# Patient Record
Sex: MALE | Race: WHITE | Employment: FULL TIME | URBAN - METROPOLITAN AREA
[De-identification: names, ages, dates, MRNs, and addresses within clinical notes are randomized per-mention and may not be internally consistent; named-entity substitution may affect disease eponyms.]

---

## 2017-08-06 ENCOUNTER — APPOINTMENT (OUTPATIENT)
Dept: GENERAL RADIOLOGY | Facility: CLINIC | Age: 32
End: 2017-08-06
Payer: COMMERCIAL

## 2017-08-06 ENCOUNTER — HOSPITAL ENCOUNTER (EMERGENCY)
Facility: CLINIC | Age: 32
Discharge: HOME | End: 2017-08-07
Attending: EMERGENCY MEDICINE
Payer: COMMERCIAL

## 2017-08-06 VITALS
SYSTOLIC BLOOD PRESSURE: 132 MMHG | TEMPERATURE: 98.2 F | BODY MASS INDEX: 21.69 KG/M2 | RESPIRATION RATE: 16 BRPM | HEART RATE: 80 BPM | DIASTOLIC BLOOD PRESSURE: 82 MMHG | WEIGHT: 135 LBS | HEIGHT: 66 IN | OXYGEN SATURATION: 100 %

## 2017-08-06 DIAGNOSIS — S86.912A KNEE STRAIN, LEFT, INITIAL ENCOUNTER: Primary | ICD-10-CM

## 2017-08-06 PROCEDURE — 73562 X-RAY EXAM OF KNEE 3: CPT

## 2017-08-06 PROCEDURE — 99283 EMERGENCY DEPT VISIT LOW MDM: CPT

## 2017-08-06 ASSESSMENT — PAIN DESCRIPTION - LOCATION: LOCATION: KNEE

## 2017-08-06 ASSESSMENT — PAIN SCALES - GENERAL: PAINLEVEL_OUTOF10: 7

## 2017-08-06 ASSESSMENT — PAIN DESCRIPTION - ORIENTATION: ORIENTATION: LEFT

## 2017-08-06 ASSESSMENT — PAIN DESCRIPTION - PAIN TYPE: TYPE: ACUTE PAIN

## 2017-08-07 RX ORDER — IBUPROFEN 800 MG/1
800 TABLET ORAL EVERY 8 HOURS PRN
Qty: 30 TABLET | Refills: 0 | Status: SHIPPED | OUTPATIENT
Start: 2017-08-07

## 2023-02-17 ENCOUNTER — OFFICE VISIT (OUTPATIENT)
Dept: FAMILY MEDICINE CLINIC | Age: 38
End: 2023-02-17

## 2023-02-17 VITALS
DIASTOLIC BLOOD PRESSURE: 92 MMHG | HEIGHT: 66 IN | HEART RATE: 107 BPM | WEIGHT: 146 LBS | SYSTOLIC BLOOD PRESSURE: 150 MMHG | BODY MASS INDEX: 23.46 KG/M2

## 2023-02-17 DIAGNOSIS — Z00.01 ENCOUNTER FOR WELL ADULT EXAM WITH ABNORMAL FINDINGS: Primary | ICD-10-CM

## 2023-02-17 DIAGNOSIS — F90.2 ATTENTION DEFICIT HYPERACTIVITY DISORDER (ADHD), COMBINED TYPE: ICD-10-CM

## 2023-02-17 DIAGNOSIS — R03.0 ELEVATED BLOOD PRESSURE READING: ICD-10-CM

## 2023-02-17 DIAGNOSIS — F19.11 HISTORY OF SUBSTANCE ABUSE (HCC): ICD-10-CM

## 2023-02-17 DIAGNOSIS — F33.42 RECURRENT MAJOR DEPRESSIVE DISORDER, IN FULL REMISSION (HCC): ICD-10-CM

## 2023-02-17 DIAGNOSIS — Z13.6 SCREENING FOR CARDIOVASCULAR CONDITION: ICD-10-CM

## 2023-02-17 PROBLEM — I48.91 ATRIAL FIBRILLATION (HCC): Status: ACTIVE | Noted: 2023-02-17

## 2023-02-17 RX ORDER — DEXTROAMPHETAMINE SACCHARATE, AMPHETAMINE ASPARTATE MONOHYDRATE, DEXTROAMPHETAMINE SULFATE AND AMPHETAMINE SULFATE 6.25; 6.25; 6.25; 6.25 MG/1; MG/1; MG/1; MG/1
CAPSULE, EXTENDED RELEASE ORAL
COMMUNITY
Start: 2023-02-06

## 2023-02-17 RX ORDER — BUPROPION HYDROCHLORIDE 300 MG/1
TABLET ORAL
COMMUNITY
Start: 2023-01-23

## 2023-02-17 SDOH — ECONOMIC STABILITY: HOUSING INSECURITY
IN THE LAST 12 MONTHS, WAS THERE A TIME WHEN YOU DID NOT HAVE A STEADY PLACE TO SLEEP OR SLEPT IN A SHELTER (INCLUDING NOW)?: NO

## 2023-02-17 SDOH — ECONOMIC STABILITY: FOOD INSECURITY: WITHIN THE PAST 12 MONTHS, THE FOOD YOU BOUGHT JUST DIDN'T LAST AND YOU DIDN'T HAVE MONEY TO GET MORE.: NEVER TRUE

## 2023-02-17 SDOH — ECONOMIC STABILITY: FOOD INSECURITY: WITHIN THE PAST 12 MONTHS, YOU WORRIED THAT YOUR FOOD WOULD RUN OUT BEFORE YOU GOT MONEY TO BUY MORE.: NEVER TRUE

## 2023-02-17 SDOH — ECONOMIC STABILITY: INCOME INSECURITY: HOW HARD IS IT FOR YOU TO PAY FOR THE VERY BASICS LIKE FOOD, HOUSING, MEDICAL CARE, AND HEATING?: SOMEWHAT HARD

## 2023-02-17 ASSESSMENT — ENCOUNTER SYMPTOMS
CHEST TIGHTNESS: 0
CONSTIPATION: 0
SHORTNESS OF BREATH: 0
BACK PAIN: 0
DIARRHEA: 1
ABDOMINAL PAIN: 0
SINUS PRESSURE: 0
COUGH: 0
COLOR CHANGE: 0

## 2023-02-17 ASSESSMENT — PATIENT HEALTH QUESTIONNAIRE - PHQ9
SUM OF ALL RESPONSES TO PHQ QUESTIONS 1-9: 1
SUM OF ALL RESPONSES TO PHQ QUESTIONS 1-9: 1
SUM OF ALL RESPONSES TO PHQ9 QUESTIONS 1 & 2: 1
2. FEELING DOWN, DEPRESSED OR HOPELESS: 1
SUM OF ALL RESPONSES TO PHQ QUESTIONS 1-9: 1
1. LITTLE INTEREST OR PLEASURE IN DOING THINGS: 0
SUM OF ALL RESPONSES TO PHQ QUESTIONS 1-9: 1

## 2023-02-17 NOTE — PROGRESS NOTES
Daphne Stock is a 30 y.o. male who presents in office today with Self and Spouse establish new care with office. Previous PCP was n/a    Chief Complaint   Patient presents with    New Patient     Establish care, last seen PCP ~10 yrs ago    Orders     Would like to have lab work ordered    Addiction Problem     H/o drug addiction, used/snorted cocaine, heroin and pills x9 years, quit 2015 now feels he may have some sinus issues        History of Present Illness:     HPI    Here to establish care. Overall feeling well. Denies any acute problems. Established with Dr. Margie Wisdom for psychiatry in \[facility]\"". Takes Wellbutrin for mood stabilization and Adderall for ADHD. Previous history of snorting cocaine and heroin. Last use 2015. He is concerned about chronic congestion. Blows his nose and nothing comes out. He denies any pain, nosebleeds, chronic infections. Just feels congested. Has used saline rinse but doesn't seem to make difference. Last Eye exam 2022, wears glasses. Last dental exam 1 yr ago, does go every year. Care gaps: COVID-19 vaccine: Moderna x3, booster 10/2022  Colon CA screening:   n/a  Vaccines: Flu shot 2022, UTD Tdap   Hepatitis C/HIV screens:    Depression Screenin - established with Dr Margie Wisdom, psychiatry. Goes every 3 months. Health Maintenance Due   Topic Date Due    Pneumococcal 0-64 years Vaccine (1 - PCV) Never done    Depression Monitoring  Never done    HIV screen  Never done    Hepatitis C screen  Never done    DTaP/Tdap/Td vaccine (1 - Tdap) Never done    Flu vaccine (1) 2022        Patient Care Team:  ASAD Orosco CNP as PCP - General (Nurse Practitioner)  ASAD Orosco CNP as PCP - Empaneled Provider    Reviewed     [x] Past Medical, Family, and Social History was reviewed per writer and does contribute to the patient presenting condition.     [x] Laboratory Results, Vital signs, Imaging, Active Problems, Immunizations, Current/Recently Discontinued Medications, Health Maintenance Activities Due, Referral Notes (if available) were reviewed per writer     [x] Reviewed Depression screening if taken or valid today or any other valid screening tool (others seen below) Interpretation of Total Score DepressionSeverity: 1-4 = Minimal depression, 5-9 = Mild depression, 10-14 = Moderate depression, 15-19 = Moderately severe depression, 20-27 =Severe depression    PHQ Scores 2/17/2023   PHQ2 Score 1   PHQ9 Score 1     Interpretation of Total Score Depression Severity: 1-4 = Minimal depression, 5-9 = Mild depression, 10-14 = Moderate depression, 15-19 = Moderately severe depression, 20-27 = Severe depression     Review of Systems (Subjective)     Review of Systems   Constitutional:  Negative for activity change, fatigue and unexpected weight change. HENT:  Positive for congestion. Negative for nosebleeds, postnasal drip and sinus pressure. Eyes:  Negative for visual disturbance. Respiratory:  Negative for cough, chest tightness and shortness of breath. Cardiovascular:  Negative for chest pain and palpitations. Gastrointestinal:  Positive for diarrhea. Negative for abdominal pain and constipation. Since stopping use of opioids. Endocrine: Negative for cold intolerance, heat intolerance and polyuria. Genitourinary:  Negative for difficulty urinating and dysuria. Musculoskeletal:  Negative for arthralgias, back pain and myalgias. Skin:  Negative for color change and rash. Allergic/Immunologic: Negative for environmental allergies and food allergies. Neurological:  Negative for dizziness, light-headedness and headaches. Psychiatric/Behavioral:  Negative for dysphoric mood. The patient is not nervous/anxious.       See HPI     Physical Assessment (Objective)     BP (!) 150/92 (Position: Sitting, Cuff Size: Medium Adult)   Pulse (!) 107   Ht 5' 6\" (1.676 m)   Wt 146 lb (66.2 kg)   BMI 23.57 kg/m² Physical Exam  Vitals and nursing note reviewed. Constitutional:       Appearance: Normal appearance. He is normal weight. HENT:      Head: Normocephalic and atraumatic. Right Ear: External ear normal.      Left Ear: External ear normal.      Nose: Nose normal.      Mouth/Throat:      Mouth: Mucous membranes are moist.      Pharynx: Oropharynx is clear. Eyes:      Extraocular Movements: Extraocular movements intact. Conjunctiva/sclera: Conjunctivae normal.      Pupils: Pupils are equal, round, and reactive to light. Cardiovascular:      Rate and Rhythm: Regular rhythm. Tachycardia present. Pulses: Normal pulses. Heart sounds: Normal heart sounds. No murmur heard. Comments: Mild tachycardia,   Pulmonary:      Effort: Pulmonary effort is normal. No respiratory distress. Breath sounds: Normal breath sounds. No wheezing. Abdominal:      General: Bowel sounds are normal. There is no distension. Palpations: Abdomen is soft. Tenderness: There is no abdominal tenderness. Musculoskeletal:         General: Normal range of motion. Cervical back: Normal range of motion and neck supple. Skin:     General: Skin is warm and dry. Capillary Refill: Capillary refill takes less than 2 seconds. Neurological:      Mental Status: He is alert and oriented to person, place, and time. Gait: Gait normal.   Psychiatric:         Mood and Affect: Mood normal.         Behavior: Behavior normal.         Thought Content: Thought content normal.       Diagnoses / Plan:   1. Encounter for well adult exam with abnormal findings  -     CBC; Future  -     Comprehensive Metabolic Panel, Fasting; Future  -     Lipid Panel; Future  2. Screening for cardiovascular condition  -     CBC; Future  -     Comprehensive Metabolic Panel, Fasting; Future  -     Lipid Panel; Future  3.  History of substance abuse [facility])  Assessment & Plan:   Well-controlled, continue current medications and no use since April 2015  Orders:  -     CBC; Future  -     Comprehensive Metabolic Panel, Fasting; Future  -     Lipid Panel; Future  4. Recurrent major depressive disorder, in full remission ([facility] [address]  5. Attention deficit hyperactivity disorder (ADHD), combined type  Assessment & Plan:   Well-controlled, continue current medications; follows with Dr Jacob Bocanegra in [facility]  6. Elevated blood pressure reading     Heart rate and blood pressure elevated. As he is taking Adderall and nervous in provider office, he will recheck and report on home cuff and have BP checked in office when he comes in for blood work. Per smart watch, HR ranges from . Discussed may need to start antihypertensive or consider other ADHD options if Adderall causing elevations. Understanding and agreement was voiced with all above plans. All questions answered to satisfaction. Encouraged healthy diet and exercise. Call office with any questions or new or worsening symptoms or concerns. Return in about 1 year (around 2/17/2024), or if symptoms worsen or fail to improve, for Annual Visit/Physical.            Electronically signed by ASAD Correa CNP on 2/17/2023 at 3:43 PM    Note is dictated utilizing voice recognition software. Unfortunately this leads to occasional typographical errors. Please contact our office if you have any questions.

## 2023-02-27 ENCOUNTER — TELEPHONE (OUTPATIENT)
Dept: FAMILY MEDICINE CLINIC | Age: 38
End: 2023-02-27

## 2023-10-11 ENCOUNTER — HOSPITAL ENCOUNTER (EMERGENCY)
Age: 38
Discharge: HOME | End: 2023-10-12
Attending: EMERGENCY MEDICINE

## 2023-10-11 VITALS
HEIGHT: 66 IN | HEART RATE: 104 BPM | SYSTOLIC BLOOD PRESSURE: 147 MMHG | BODY MASS INDEX: 27.32 KG/M2 | RESPIRATION RATE: 20 BRPM | OXYGEN SATURATION: 97 % | DIASTOLIC BLOOD PRESSURE: 106 MMHG | WEIGHT: 170 LBS | TEMPERATURE: 98 F

## 2023-10-11 DIAGNOSIS — S21.111A LACERATION OF CHEST WALL, RIGHT, INITIAL ENCOUNTER: ICD-10-CM

## 2023-10-11 DIAGNOSIS — F10.929 ACUTE ALCOHOLIC INTOXICATION WITH COMPLICATION (HCC): Primary | ICD-10-CM

## 2023-10-11 DIAGNOSIS — F19.10 POLYSUBSTANCE ABUSE (HCC): ICD-10-CM

## 2023-10-11 DIAGNOSIS — S41.111A ARM LACERATION, RIGHT, INITIAL ENCOUNTER: ICD-10-CM

## 2023-10-11 LAB
ALBUMIN SERPL-MCNC: 4.6 G/DL (ref 3.5–5.2)
ALP SERPL-CCNC: 99 U/L (ref 40–129)
ALT SERPL-CCNC: 20 U/L (ref 5–41)
AMPHET UR QL SCN: POSITIVE
ANION GAP SERPL CALCULATED.3IONS-SCNC: 11 MMOL/L (ref 9–17)
APAP SERPL-MCNC: <5 UG/ML (ref 10–30)
AST SERPL-CCNC: 30 U/L
BARBITURATES UR QL SCN: NEGATIVE
BASOPHILS # BLD: 0 K/UL (ref 0–0.2)
BASOPHILS NFR BLD: 0 % (ref 0–2)
BENZODIAZ UR QL: NEGATIVE
BILIRUB SERPL-MCNC: 0.2 MG/DL (ref 0.3–1.2)
BUN SERPL-MCNC: 6 MG/DL (ref 6–20)
CALCIUM SERPL-MCNC: 9.2 MG/DL (ref 8.6–10.4)
CANNABINOIDS UR QL SCN: POSITIVE
CHLORIDE SERPL-SCNC: 103 MMOL/L (ref 98–107)
CO2 SERPL-SCNC: 26 MMOL/L (ref 20–31)
COCAINE UR QL SCN: POSITIVE
CREAT SERPL-MCNC: 1.1 MG/DL (ref 0.7–1.2)
EOSINOPHIL # BLD: 0.2 K/UL (ref 0–0.4)
EOSINOPHILS RELATIVE PERCENT: 1 % (ref 0–4)
ERYTHROCYTE [DISTWIDTH] IN BLOOD BY AUTOMATED COUNT: 13.1 % (ref 11.5–14.9)
ETHANOL PERCENT: 0.21 %
ETHANOLAMINE SERPL-MCNC: 207 MG/DL
FENTANYL UR QL: NEGATIVE
GFR SERPL CREATININE-BSD FRML MDRD: >60 ML/MIN/1.73M2
GLUCOSE SERPL-MCNC: 107 MG/DL (ref 70–99)
HCT VFR BLD AUTO: 43 % (ref 41–53)
HGB BLD-MCNC: 14.4 G/DL (ref 13.5–17.5)
LYMPHOCYTES NFR BLD: 2.2 K/UL (ref 1–4.8)
LYMPHOCYTES RELATIVE PERCENT: 19 % (ref 24–44)
MCH RBC QN AUTO: 31.2 PG (ref 26–34)
MCHC RBC AUTO-ENTMCNC: 33.4 G/DL (ref 31–37)
MCV RBC AUTO: 93.3 FL (ref 80–100)
METHADONE UR QL: NEGATIVE
MONOCYTES NFR BLD: 0.8 K/UL (ref 0.1–1.3)
MONOCYTES NFR BLD: 7 % (ref 1–7)
NEUTROPHILS NFR BLD: 73 % (ref 36–66)
NEUTS SEG NFR BLD: 8.4 K/UL (ref 1.3–9.1)
OPIATES UR QL SCN: NEGATIVE
OXYCODONE UR QL SCN: NEGATIVE
PCP UR QL SCN: NEGATIVE
PLATELET # BLD AUTO: 319 K/UL (ref 150–450)
PMV BLD AUTO: 7.8 FL (ref 6–12)
POTASSIUM SERPL-SCNC: 5.1 MMOL/L (ref 3.7–5.3)
PROT SERPL-MCNC: 7.8 G/DL (ref 6.4–8.3)
RBC # BLD AUTO: 4.61 M/UL (ref 4.5–5.9)
SALICYLATES SERPL-MCNC: <1 MG/DL (ref 3–10)
SODIUM SERPL-SCNC: 140 MMOL/L (ref 135–144)
TEST INFORMATION: ABNORMAL
WBC OTHER # BLD: 11.6 K/UL (ref 3.5–11)

## 2023-10-11 PROCEDURE — 80307 DRUG TEST PRSMV CHEM ANLYZR: CPT

## 2023-10-11 PROCEDURE — 90715 TDAP VACCINE 7 YRS/> IM: CPT | Performed by: EMERGENCY MEDICINE

## 2023-10-11 PROCEDURE — 80053 COMPREHEN METABOLIC PANEL: CPT

## 2023-10-11 PROCEDURE — 12002 RPR S/N/AX/GEN/TRNK2.6-7.5CM: CPT

## 2023-10-11 PROCEDURE — 80143 DRUG ASSAY ACETAMINOPHEN: CPT

## 2023-10-11 PROCEDURE — 36415 COLL VENOUS BLD VENIPUNCTURE: CPT

## 2023-10-11 PROCEDURE — 99284 EMERGENCY DEPT VISIT MOD MDM: CPT

## 2023-10-11 PROCEDURE — 90471 IMMUNIZATION ADMIN: CPT | Performed by: EMERGENCY MEDICINE

## 2023-10-11 PROCEDURE — G0480 DRUG TEST DEF 1-7 CLASSES: HCPCS

## 2023-10-11 PROCEDURE — 85025 COMPLETE CBC W/AUTO DIFF WBC: CPT

## 2023-10-11 PROCEDURE — 6360000002 HC RX W HCPCS: Performed by: EMERGENCY MEDICINE

## 2023-10-11 PROCEDURE — 80179 DRUG ASSAY SALICYLATE: CPT

## 2023-10-11 RX ORDER — HALOPERIDOL 5 MG/ML
5 INJECTION INTRAMUSCULAR
Status: DISCONTINUED | OUTPATIENT
Start: 2023-10-11 | End: 2023-10-12 | Stop reason: HOSPADM

## 2023-10-11 RX ORDER — DIPHENHYDRAMINE HYDROCHLORIDE 50 MG/ML
50 INJECTION INTRAMUSCULAR; INTRAVENOUS
Status: DISCONTINUED | OUTPATIENT
Start: 2023-10-11 | End: 2023-10-12 | Stop reason: HOSPADM

## 2023-10-11 RX ORDER — LORAZEPAM 2 MG/ML
2 INJECTION INTRAMUSCULAR
Status: DISCONTINUED | OUTPATIENT
Start: 2023-10-11 | End: 2023-10-12 | Stop reason: HOSPADM

## 2023-10-11 RX ORDER — HALOPERIDOL 5 MG/ML
5 INJECTION INTRAMUSCULAR
Status: DISCONTINUED | OUTPATIENT
Start: 2023-10-11 | End: 2023-10-11

## 2023-10-11 RX ORDER — LORAZEPAM 2 MG/ML
2 INJECTION INTRAMUSCULAR
Status: DISCONTINUED | OUTPATIENT
Start: 2023-10-11 | End: 2023-10-11

## 2023-10-11 RX ORDER — DIPHENHYDRAMINE HYDROCHLORIDE 50 MG/ML
50 INJECTION INTRAMUSCULAR; INTRAVENOUS
Status: DISCONTINUED | OUTPATIENT
Start: 2023-10-11 | End: 2023-10-11

## 2023-10-11 RX ADMIN — TETANUS TOXOID, REDUCED DIPHTHERIA TOXOID AND ACELLULAR PERTUSSIS VACCINE, ADSORBED 0.5 ML: 5; 2.5; 8; 8; 2.5 SUSPENSION INTRAMUSCULAR at 21:53

## 2023-10-11 ASSESSMENT — LIFESTYLE VARIABLES
HOW OFTEN DO YOU HAVE A DRINK CONTAINING ALCOHOL: MONTHLY OR LESS
HOW MANY STANDARD DRINKS CONTAINING ALCOHOL DO YOU HAVE ON A TYPICAL DAY: 7 TO 9

## 2023-10-12 PROCEDURE — 12002 RPR S/N/AX/GEN/TRNK2.6-7.5CM: CPT

## 2023-10-12 NOTE — ED NOTES
Pt alert and oriented at this time. Denies SI/HI. Pt discharged in stable condition with dc instructions. Pt ambulates to door with steady gait and without assistance.         Lenka Wallace RN  10/12/23 9913

## 2023-10-12 NOTE — ED NOTES
Pt heard yelling in the nurses station demanding to leave. Despite being redirected by RN and safe guard, pt continues to escalate.  Public Safety called       Saray Antunez RN  10/11/23 [id number]

## 2023-10-12 NOTE — ED NOTES
Patient changed to  [facility] and  pants with security  , All items  removed and locked  in [facility]     Epifanio Rondon RN  10/11/23 7864

## 2023-10-12 NOTE — ED TRIAGE NOTES
Mode of arrival (squad #, walk in, police, etc) : Squad        Chief complaint(s): Suicidal         Arrival Note (brief scenario, treatment PTA, etc). : Patient  Gianfranco Aime by police  with  Laceration to his right arm and right lateral Abdominal wall by Smashing with glass door . Patient is intoxicated  with ETOH. The police informed that  the patient  girl reported he was suicidal  and was making statement to kill himself. Patient admit that he some issues at home but denies suicidal  attempt and suicidal Does have an history of  suicidal ideation. Patient has been pink slip for suicidal attempt by police     C= Josey Caul you ever felt that you should Cut down on your drinking? \"  No  A= \"Have people Annoyed you by criticizing your drinking? \"  No  G= \"Have you ever felt bad or Guilty about your drinking? \"  No  E= \"Have you ever had a drink as an Eye-opener first thing in the morning to steady your nerves or to help a hangover? \"  No      Deferred []      Reason for deferring: N/A    *If yes to two or more: probable alcohol abuse. *

## 2023-10-12 NOTE — ED NOTES
Pts right arm laceration and right rib cage lacerations cleansed, irrigated, and closed with steri strips and dermabond. Pt tolerated well.       Eduarda Dumont RN  10/12/23 3807

## 2024-03-14 ENCOUNTER — HOSPITAL ENCOUNTER (OUTPATIENT)
Age: 39
Discharge: HOME | End: 2024-03-14

## 2024-03-14 LAB — LITHIUM LEVEL: 0.4 MMOL/L (ref 0.6–1.2)

## 2024-03-14 PROCEDURE — 80178 ASSAY OF LITHIUM: CPT

## 2024-03-14 PROCEDURE — 36415 COLL VENOUS BLD VENIPUNCTURE: CPT

## 2024-05-28 ENCOUNTER — OFFICE VISIT (OUTPATIENT)
Dept: FAMILY MEDICINE CLINIC | Age: 39
End: 2024-05-28
Payer: COMMERCIAL

## 2024-05-28 VITALS — DIASTOLIC BLOOD PRESSURE: 86 MMHG | HEART RATE: 97 BPM | SYSTOLIC BLOOD PRESSURE: 130 MMHG

## 2024-05-28 DIAGNOSIS — R68.89 HEAT INTOLERANCE: Primary | ICD-10-CM

## 2024-05-28 DIAGNOSIS — Z13.220 LIPID SCREENING: ICD-10-CM

## 2024-05-28 DIAGNOSIS — Z11.3 SCREENING EXAMINATION FOR STI: ICD-10-CM

## 2024-05-28 DIAGNOSIS — Z00.00 HEALTH CARE MAINTENANCE: ICD-10-CM

## 2024-05-28 PROCEDURE — 99203 OFFICE O/P NEW LOW 30 MIN: CPT

## 2024-05-28 PROCEDURE — G8419 CALC BMI OUT NRM PARAM NOF/U: HCPCS

## 2024-05-28 PROCEDURE — 1036F TOBACCO NON-USER: CPT

## 2024-05-28 PROCEDURE — G8427 DOCREV CUR MEDS BY ELIG CLIN: HCPCS

## 2024-05-28 RX ORDER — LITHIUM CARBONATE 300 MG
25 TABLET ORAL NIGHTLY
COMMUNITY

## 2024-05-28 RX ORDER — LITHIUM CARBONATE 300 MG
50 TABLET ORAL EVERY MORNING
COMMUNITY
Start: 2024-05-08

## 2024-05-28 SDOH — ECONOMIC STABILITY: FOOD INSECURITY: WITHIN THE PAST 12 MONTHS, YOU WORRIED THAT YOUR FOOD WOULD RUN OUT BEFORE YOU GOT MONEY TO BUY MORE.: NEVER TRUE

## 2024-05-28 SDOH — ECONOMIC STABILITY: INCOME INSECURITY: HOW HARD IS IT FOR YOU TO PAY FOR THE VERY BASICS LIKE FOOD, HOUSING, MEDICAL CARE, AND HEATING?: SOMEWHAT HARD

## 2024-05-28 SDOH — ECONOMIC STABILITY: FOOD INSECURITY: WITHIN THE PAST 12 MONTHS, THE FOOD YOU BOUGHT JUST DIDN'T LAST AND YOU DIDN'T HAVE MONEY TO GET MORE.: NEVER TRUE

## 2024-05-28 ASSESSMENT — PATIENT HEALTH QUESTIONNAIRE - PHQ9
8. MOVING OR SPEAKING SO SLOWLY THAT OTHER PEOPLE COULD HAVE NOTICED. OR THE OPPOSITE, BEING SO FIGETY OR RESTLESS THAT YOU HAVE BEEN MOVING AROUND A LOT MORE THAN USUAL: NOT AT ALL
9. THOUGHTS THAT YOU WOULD BE BETTER OFF DEAD, OR OF HURTING YOURSELF: NOT AT ALL
1. LITTLE INTEREST OR PLEASURE IN DOING THINGS: NOT AT ALL
SUM OF ALL RESPONSES TO PHQ9 QUESTIONS 1 & 2: 0
SUM OF ALL RESPONSES TO PHQ QUESTIONS 1-9: 0
4. FEELING TIRED OR HAVING LITTLE ENERGY: NOT AT ALL
7. TROUBLE CONCENTRATING ON THINGS, SUCH AS READING THE NEWSPAPER OR WATCHING TELEVISION: NOT AT ALL
SUM OF ALL RESPONSES TO PHQ QUESTIONS 1-9: 0
5. POOR APPETITE OR OVEREATING: NOT AT ALL
10. IF YOU CHECKED OFF ANY PROBLEMS, HOW DIFFICULT HAVE THESE PROBLEMS MADE IT FOR YOU TO DO YOUR WORK, TAKE CARE OF THINGS AT HOME, OR GET ALONG WITH OTHER PEOPLE: NOT DIFFICULT AT ALL
2. FEELING DOWN, DEPRESSED OR HOPELESS: NOT AT ALL
SUM OF ALL RESPONSES TO PHQ QUESTIONS 1-9: 0
SUM OF ALL RESPONSES TO PHQ QUESTIONS 1-9: 0
6. FEELING BAD ABOUT YOURSELF - OR THAT YOU ARE A FAILURE OR HAVE LET YOURSELF OR YOUR FAMILY DOWN: NOT AT ALL
3. TROUBLE FALLING OR STAYING ASLEEP: NOT AT ALL

## 2024-05-28 ASSESSMENT — ENCOUNTER SYMPTOMS
VOMITING: 0
NAUSEA: 0
SORE THROAT: 0
DIARRHEA: 0
SHORTNESS OF BREATH: 0
ABDOMINAL PAIN: 0
CONSTIPATION: 0
RHINORRHEA: 0

## 2024-05-28 NOTE — PROGRESS NOTES
[facility] Residency Program - Outpatient Note      Subjective:    Darinel Lux is a 38 y.o. male with  has a past medical history of Atrial fibrillation (HCC).    Presented to the office today for:  Chief Complaint   Patient presents with    Establish Care     Transferring PCPs, has been years since having bloodwork done, would like basic BW        HPI    Patient is a 38-year-old male with a past medical history of A-fib s/p ablation, hypertension, ADHD seen today to establish care    Blood pressure well-controlled today 130/86.  Not on any blood pressure medications    Follows up with psychiatrist Dr. Deluna. Treated for ADHD and bipolar disorder. Adderall XR 30 bid, Lithium 75 mg   PSHx: right hand reconstruction surgery in 2015    Shx:  - Smoking: currently vaping, quit smoking cigarettes in 2016(started at 15, 1.5 ppd)  - Alcohol: social, drank bottle of with partner a week ago, used to be heavy drinker but cut back significantly Oct 2023  - Drugs: marijuana use, History of snorting cocaine and heroin.  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   HENT:  Negative for rhinorrhea and sore throat.    Eyes:  Negative for visual disturbance.   Respiratory:  Negative for shortness of breath.    Cardiovascular:  Negative for chest pain and palpitations.   Gastrointestinal:  Negative for abdominal pain, constipation, diarrhea, nausea and vomiting.   Endocrine: Negative.    Genitourinary:  Negative for dysuria.   Musculoskeletal: Negative.    Skin:  Negative for rash.   Neurological:  Negative for headaches.   Psychiatric/Behavioral: Negative.                   The patient has a   Family History   Problem Relation Age of Onset    No Known Problems Mother     No Known Problems

## 2024-05-28 NOTE — PROGRESS NOTES
Attending Physician Statement  I have discussed the care of BrianKingincluding pertinent history and exam findings,  with the resident. I have reviewed the key elements of all parts of the encounter with the resident.  I agree with the assessment, plan and orders as documented by the resident.  (GE Modifier)    NP to establish care  Poly substance use-Marijuana use and aping  H/o Alcohol abuse  ADHD- fu with Psych  HM-ordered

## 2024-11-21 ENCOUNTER — HOSPITAL ENCOUNTER (OUTPATIENT)
Age: 39
Discharge: HOME | End: 2024-11-21
Payer: COMMERCIAL

## 2024-11-21 LAB — LITHIUM LEVEL: 1.1 MMOL/L (ref 0.6–1.2)

## 2024-11-21 PROCEDURE — 80178 ASSAY OF LITHIUM: CPT

## 2024-11-21 PROCEDURE — 36415 COLL VENOUS BLD VENIPUNCTURE: CPT

## 2025-01-29 ENCOUNTER — TELEPHONE (OUTPATIENT)
Dept: FAMILY MEDICINE CLINIC | Age: 40
End: 2025-01-29

## 2025-01-29 NOTE — TELEPHONE ENCOUNTER
Patient friend called in regarding labs. Writer informed still able to get them done. Patient will get labs done today. Writer offered to schedule an appointment for patient. Caitlin declined and stated will call back to schedule later time after labs.

## 2025-03-31 ENCOUNTER — OFFICE VISIT (OUTPATIENT)
Dept: FAMILY MEDICINE CLINIC | Age: 40
End: 2025-03-31
Payer: COMMERCIAL

## 2025-03-31 VITALS
SYSTOLIC BLOOD PRESSURE: 101 MMHG | BODY MASS INDEX: 24.23 KG/M2 | OXYGEN SATURATION: 98 % | HEIGHT: 66 IN | WEIGHT: 150.8 LBS | DIASTOLIC BLOOD PRESSURE: 66 MMHG | HEART RATE: 86 BPM

## 2025-03-31 DIAGNOSIS — M51.362 DEGENERATION OF INTERVERTEBRAL DISC OF LUMBAR REGION WITH DISCOGENIC BACK PAIN AND LOWER EXTREMITY PAIN: Primary | ICD-10-CM

## 2025-03-31 PROCEDURE — 99213 OFFICE O/P EST LOW 20 MIN: CPT

## 2025-03-31 RX ORDER — KETOROLAC TROMETHAMINE 30 MG/ML
30 INJECTION, SOLUTION INTRAMUSCULAR; INTRAVENOUS ONCE
Qty: 1 ML | Refills: 0
Start: 2025-03-31 | End: 2025-03-31 | Stop reason: CLARIF

## 2025-03-31 RX ORDER — LIDOCAINE 4 G/G
1 PATCH TOPICAL DAILY
Qty: 10 EACH | Refills: 0 | Status: SHIPPED | OUTPATIENT
Start: 2025-03-31 | End: 2025-04-10

## 2025-03-31 RX ORDER — KETOROLAC TROMETHAMINE 30 MG/ML
30 INJECTION, SOLUTION INTRAMUSCULAR; INTRAVENOUS ONCE
Status: COMPLETED | OUTPATIENT
Start: 2025-03-31 | End: 2025-03-31

## 2025-03-31 RX ADMIN — KETOROLAC TROMETHAMINE 30 MG: 30 INJECTION, SOLUTION INTRAMUSCULAR at 10:13

## 2025-03-31 SDOH — ECONOMIC STABILITY: FOOD INSECURITY: WITHIN THE PAST 12 MONTHS, THE FOOD YOU BOUGHT JUST DIDN'T LAST AND YOU DIDN'T HAVE MONEY TO GET MORE.: NEVER TRUE

## 2025-03-31 SDOH — ECONOMIC STABILITY: FOOD INSECURITY: WITHIN THE PAST 12 MONTHS, YOU WORRIED THAT YOUR FOOD WOULD RUN OUT BEFORE YOU GOT MONEY TO BUY MORE.: NEVER TRUE

## 2025-03-31 ASSESSMENT — PATIENT HEALTH QUESTIONNAIRE - PHQ9
1. LITTLE INTEREST OR PLEASURE IN DOING THINGS: NOT AT ALL
SUM OF ALL RESPONSES TO PHQ QUESTIONS 1-9: 0
2. FEELING DOWN, DEPRESSED OR HOPELESS: NOT AT ALL
9. THOUGHTS THAT YOU WOULD BE BETTER OFF DEAD, OR OF HURTING YOURSELF: NOT AT ALL
SUM OF ALL RESPONSES TO PHQ QUESTIONS 1-9: 0
8. MOVING OR SPEAKING SO SLOWLY THAT OTHER PEOPLE COULD HAVE NOTICED. OR THE OPPOSITE, BEING SO FIGETY OR RESTLESS THAT YOU HAVE BEEN MOVING AROUND A LOT MORE THAN USUAL: NOT AT ALL
SUM OF ALL RESPONSES TO PHQ QUESTIONS 1-9: 0
6. FEELING BAD ABOUT YOURSELF - OR THAT YOU ARE A FAILURE OR HAVE LET YOURSELF OR YOUR FAMILY DOWN: NOT AT ALL
SUM OF ALL RESPONSES TO PHQ QUESTIONS 1-9: 0
7. TROUBLE CONCENTRATING ON THINGS, SUCH AS READING THE NEWSPAPER OR WATCHING TELEVISION: NOT AT ALL
5. POOR APPETITE OR OVEREATING: NOT AT ALL
10. IF YOU CHECKED OFF ANY PROBLEMS, HOW DIFFICULT HAVE THESE PROBLEMS MADE IT FOR YOU TO DO YOUR WORK, TAKE CARE OF THINGS AT HOME, OR GET ALONG WITH OTHER PEOPLE: NOT DIFFICULT AT ALL
4. FEELING TIRED OR HAVING LITTLE ENERGY: NOT AT ALL
3. TROUBLE FALLING OR STAYING ASLEEP: NOT AT ALL

## 2025-03-31 NOTE — PROGRESS NOTES
HYPERTENSION visit     BP Readings from Last 3 Encounters:   05/28/24 130/86   10/11/23 (!) 147/106   02/17/23 (!) 150/92       BUN (mg/dL)   Date Value   10/11/2023 6     Creatinine (mg/dL)   Date Value   10/11/2023 1.1     Glucose (mg/dL)   Date Value   10/11/2023 107 (H)              Have you changed or started any medications since your last visit including any over-the-counter medicines, vitamins, or herbal medicines? no   Have you stopped taking any of your medications? Is so, why? -  no  Are you having any side effects from any of your medications? - no  How often do you miss doses of your medication? no      Have you seen any other physician or provider since your last visit?  no   Have you had any other diagnostic tests since your last visit?  yes - labs   Have you been seen in the emergency room and/or had an admission in a hospital since we last saw you?  no   Have you had your routine dental cleaning in the past 6 months?  no     Do you have an active MyChart account? If no, what is the barrier?  No: pending    Patient Care Team:  Alisha Hong MD as PCP - General (Family Medicine)    Medical History Review  Past Medical, Family, and Social History reviewed and does not contribute to the patient presenting condition    Health Maintenance   Topic Date Due    Varicella vaccine (1 of 2 - 13+ 2-dose series) Never done    HIV screen  Never done    Hepatitis C screen  Never done    Hepatitis B vaccine (1 of 3 - 19+ 3-dose series) Never done    Flu vaccine (1) 08/01/2024    COVID-19 Vaccine (4 - 2024-25 season) 09/01/2024    Depression Monitoring  05/28/2025    DTaP/Tdap/Td vaccine (2 - Td or Tdap) 10/11/2033    Hepatitis A vaccine  Aged Out    Hib vaccine  Aged Out    HPV vaccine  Aged Out    Polio vaccine  Aged Out    Meningococcal (ACWY) vaccine  Aged Out    Meningococcal B vaccine  Aged Out    Pneumococcal 0-49 years Vaccine  Aged Out    Depression Screen  Discontinued         
Prescriptions Disp Refills    lidocaine 4 % external patch 10 each 0     Sig: Place 1 patch onto the skin daily for 10 days       Medications Discontinued During This Encounter   Medication Reason    ketorolac (TORADOL) 30 MG/ML injection ERROR       Darinel received counseling on the following healthy behaviors: nutrition, exercise and medication adherence. We discussed use, benefit, and side effects of prescribed medications. Barriers to medication compliance were addressed. All patient questions were answered. The patient verbalized understanding.     Return in about 1 week (around 4/7/2025), or if symptoms worsen or fail to improve.    Disclaimer: Some or all of this note was transcribed using voice-recognition software. This may occasionally cause typographical errors. Although every effort is made to fix these errors, please do not hesitate to contact our office if there is any concern with the understanding of this note.

## 2025-03-31 NOTE — PATIENT INSTRUCTIONS
Thank you for letting us take care of you today. We hope all your questions were addressed. If a question was overlooked or something else comes to mind after you return home, please contact a member of your Care Team listed below.      Your Care Team at [facility] is Team #1  Theresa Booth M.D. (Faculty)  Tho Presley M.D. (Resident)  Jules Smyth D.O. (Resident)  Marco Antonio Hicks M.D. (Resident)  Daniella Villar M.D. (Resident)  Camila Acuna, [facility]  Gulshan Ruiz, [facility]  Jessica Pool, [facility]  Leatha Gutierrez, [facility]  Bhavna Messer, [facility]  Caroline Bojorquez, [facility]  Lang Yost (LJ) Nisha,   Karon Ramirez [facility] (Clinical Pharmacist)     Office phone number: [phone]    If you need to get in right away due to illness, please be advised we have \"Same Day\" appointments available Monday-Friday. Please call us at [phone] option #3 to schedule your \"Same Day\" appointment.   
normal...

## 2025-04-01 ENCOUNTER — TELEPHONE (OUTPATIENT)
Dept: FAMILY MEDICINE CLINIC | Age: 40
End: 2025-04-01

## 2025-04-01 NOTE — TELEPHONE ENCOUNTER
Patient called office in regards to returning to work with no restrictions. Patient scheduled with PCP on 4-4-2025.

## 2025-04-04 ENCOUNTER — OFFICE VISIT (OUTPATIENT)
Dept: FAMILY MEDICINE CLINIC | Age: 40
End: 2025-04-04
Payer: COMMERCIAL

## 2025-04-04 VITALS
WEIGHT: 151.2 LBS | HEIGHT: 66 IN | SYSTOLIC BLOOD PRESSURE: 139 MMHG | BODY MASS INDEX: 24.3 KG/M2 | DIASTOLIC BLOOD PRESSURE: 84 MMHG | HEART RATE: 82 BPM

## 2025-04-04 DIAGNOSIS — S33.5XXD LUMBAR SPRAIN, SUBSEQUENT ENCOUNTER: Primary | ICD-10-CM

## 2025-04-04 PROBLEM — S33.5XXA LUMBAR BACK SPRAIN: Status: ACTIVE | Noted: 2025-04-04

## 2025-04-04 PROCEDURE — 99213 OFFICE O/P EST LOW 20 MIN: CPT

## 2025-04-04 ASSESSMENT — ENCOUNTER SYMPTOMS
NAUSEA: 0
DIARRHEA: 0
CONSTIPATION: 0
ABDOMINAL PAIN: 0
BACK PAIN: 1
SHORTNESS OF BREATH: 0
VOMITING: 0

## 2025-04-04 NOTE — PROGRESS NOTES
[facility] Residency Program - Outpatient Note      Subjective:    Darinel Lux is a 39 y.o. male with  has a past medical history of Atrial fibrillation (HCC).    Presented to the office today for:  Chief Complaint   Patient presents with    Back Pain     Needs a return to work note       HPI  Patient is a 39-year-old male  Seen 1 week ago for acute onset back pain secondary to trauma from lifting heavy items.  Patient was referred to physical therapy, prescribed lidocaine patches and given a Toradol injection in the clinic.  Today patient states that the back pain is significantly improved.  Has been performing stretching exercises at home.  Has full range of motion.  Denies any numbness tingling lower extremities or weakness.  Requesting letter to return to work with no restrictions.          Review of Systems   Constitutional:  Negative for fatigue.   Eyes:  Negative for visual disturbance.   Respiratory:  Negative for shortness of breath.    Cardiovascular:  Negative for chest pain and palpitations.   Gastrointestinal:  Negative for abdominal pain, constipation, diarrhea, nausea and vomiting.   Endocrine: Negative.    Genitourinary:  Negative for dysuria.   Musculoskeletal:  Positive for back pain (improved).   Skin:  Negative for rash.   Neurological:  Negative for headaches.   Psychiatric/Behavioral: Negative.                   The patient has a   Family History   Problem Relation Age of Onset    No Known Problems Mother     No Known Problems Father     No Known Problems Sister     No Known Problems Brother     No Known Problems Maternal Grandmother     No Known Problems Maternal Grandfather     No Known Problems Paternal Grandmother     No Known Problems Paternal Grandfather     No Known Problems Other        Objective:    /84 (BP Site: Left Upper Arm, Patient Position: Sitting, BP Cuff Size: Medium Adult)   Pulse 82   Ht 1.676 m (5' 5.98\")   Wt 68.6 kg (151 lb

## 2025-04-04 NOTE — PATIENT INSTRUCTIONS
Thank you for letting us take care of you today. We hope all your questions were addressed. If a question was overlooked or something else comes to mind after you return home, please contact a member of your Care Team listed below.      Your Care Team at [facility] is Team #2  Phi Conner M.D. (Faculty)  Renee Alcaraz M.D. (Resident)  Alisha Hong M.D. (Resident)  Jay Denny M.D. (Resident)  Janel Christian M.D. (Resident)  Leyda Lopez M.D. (Resident)  Lang Acuna, [facility]  Ann Marie Ruiz, [facility]  Jessica Pool,  [facility]  Leatha Gutierrez, [facility]  Bhavna Messer, [facility]  Caroline Bojorquez, [facility]  Gulshan Yost (LJ) Nisha ANGELA (Clinical Practice Manager)  Karon Ramirez [facility] (Clinical Pharmacist)     Office phone number: [phone]    If you need to get in right away due to illness, please be advised we have \"Same Day\" appointments available Monday-Friday. Please call us at [phone] option #3 to schedule your \"Same Day\" appointment.

## 2025-04-04 NOTE — PROGRESS NOTES
Attending Physician Statement  I have discussed the care of Darinel Lux, including pertinent history and exam findings,  with the resident. I have reviewed the key elements of all parts of the encounter with the resident.  I agree with the assessment, plan and orders as documented by the resident.  (GE Modifier)    Ivon Maldonado MD

## 2025-04-04 NOTE — PROGRESS NOTES
Visit Information    Have you changed or started any medications since your last visit including any over-the-counter medicines, vitamins, or herbal medicines? no   Have you stopped taking any of your medications? Is so, why? -  no  Are you having any side effects from any of your medications? - no    Have you seen any other physician or provider since your last visit?  no   Have you had any other diagnostic tests since your last visit?  no   Have you been seen in the emergency room and/or had an admission in a hospital since we last saw you?  no   Have you had your routine dental cleaning in the past 6 months?  no     Do you have an active MyChart account? If no, what is the barrier?  Yes    Patient Care Team:  Alisha Hong MD as PCP - General (Family Medicine)    Medical History Review  Past Medical, Family, and Social History reviewed and does not contribute to the patient presenting condition    Health Maintenance   Topic Date Due    Varicella vaccine (1 of 2 - 13+ 2-dose series) Never done    HIV screen  Never done    Hepatitis C screen  Never done    Hepatitis B vaccine (1 of 3 - 19+ 3-dose series) Never done    COVID-19 Vaccine (4 - 2024-25 season) 09/01/2024    Flu vaccine (Season Ended) 08/01/2025    Depression Monitoring  03/31/2026    DTaP/Tdap/Td vaccine (2 - Td or Tdap) 10/11/2033    Hepatitis A vaccine  Aged Out    Hib vaccine  Aged Out    HPV vaccine  Aged Out    Polio vaccine  Aged Out    Meningococcal (ACWY) vaccine  Aged Out    Meningococcal B vaccine  Aged Out    Pneumococcal 0-49 years Vaccine  Aged Out    Depression Screen  Discontinued

## 2025-05-15 ENCOUNTER — OFFICE VISIT (OUTPATIENT)
Age: 40
End: 2025-05-15
Payer: COMMERCIAL

## 2025-05-15 VITALS
SYSTOLIC BLOOD PRESSURE: 110 MMHG | BODY MASS INDEX: 23.95 KG/M2 | HEART RATE: 95 BPM | OXYGEN SATURATION: 97 % | HEIGHT: 66 IN | DIASTOLIC BLOOD PRESSURE: 70 MMHG | WEIGHT: 149 LBS

## 2025-05-15 DIAGNOSIS — L02.212 CUTANEOUS ABSCESS OF BACK EXCLUDING BUTTOCKS: Primary | ICD-10-CM

## 2025-05-15 PROCEDURE — 96372 THER/PROPH/DIAG INJ SC/IM: CPT

## 2025-05-15 PROCEDURE — 99213 OFFICE O/P EST LOW 20 MIN: CPT

## 2025-05-15 RX ORDER — CLINDAMYCIN HYDROCHLORIDE 300 MG/1
300 CAPSULE ORAL 4 TIMES DAILY
Qty: 40 CAPSULE | Refills: 0 | Status: SHIPPED | OUTPATIENT
Start: 2025-05-15 | End: 2025-05-25

## 2025-05-15 RX ORDER — ACETAMINOPHEN AND CODEINE PHOSPHATE 300; 15 MG/1; MG/1
1 TABLET ORAL EVERY 8 HOURS PRN
Qty: 9 TABLET | Refills: 0 | Status: SHIPPED | OUTPATIENT
Start: 2025-05-15 | End: 2025-05-18

## 2025-05-15 RX ORDER — LIDOCAINE HYDROCHLORIDE AND EPINEPHRINE 10; 10 MG/ML; UG/ML
20 INJECTION, SOLUTION INFILTRATION; PERINEURAL ONCE
Status: COMPLETED | OUTPATIENT
Start: 2025-05-15 | End: 2025-05-15

## 2025-05-15 RX ORDER — LIDOCAINE HYDROCHLORIDE AND EPINEPHRINE BITARTRATE 20; .01 MG/ML; MG/ML
20 INJECTION, SOLUTION SUBCUTANEOUS ONCE
Status: CANCELLED | OUTPATIENT
Start: 2025-05-15 | End: 2025-05-15

## 2025-05-15 RX ADMIN — LIDOCAINE HYDROCHLORIDE AND EPINEPHRINE 20 ML: 10; 10 INJECTION, SOLUTION INFILTRATION; PERINEURAL at 15:22

## 2025-05-16 ENCOUNTER — OFFICE VISIT (OUTPATIENT)
Age: 40
End: 2025-05-16

## 2025-05-16 VITALS — RESPIRATION RATE: 12 BRPM

## 2025-05-16 DIAGNOSIS — L02.212 CUTANEOUS ABSCESS OF BACK EXCLUDING BUTTOCKS: Primary | ICD-10-CM

## 2025-05-16 ASSESSMENT — ENCOUNTER SYMPTOMS
WHEEZING: 0
COUGH: 0
SHORTNESS OF BREATH: 0
CONSTIPATION: 0
ABDOMINAL PAIN: 0
DIARRHEA: 0
VOMITING: 0
COLOR CHANGE: 1
NAUSEA: 0
BACK PAIN: 1

## 2025-05-16 NOTE — PROGRESS NOTES
[facility]  [address]  [place] [address]  Phone: [phone]  Fax: [phone]    Darinel Lux  1985  MRN: [id number]  Date of visit: 2025    CHIEF COMPLAINT:   Darinel Lux (:  1985) is a 39 y.o. male,New patient, here for evaluation of the following chief complaint(s):  Back Pain (Patient is reporting pain to middle back. Reports that he had an attempted I&D of an ingrown hair yesterday at his PCP.  Today area is painful, red, tender, slight warmth to touch.  )      SUBJECTIVE/ OBJECTIVE:  Darinel Lux presents today to [facility] for Patient presents to clinic for abscess/cyst to bilateral of his back.  He reports he was PCP yesterday they attempted to complete an incision and drainage that was unsuccessful.  Patient reports he has a general surgery appointment in 2 weeks he is on clindamycin and Tylenol with codeine.  Patient today is requesting this provider to assess and determine if requires immediate medical attention.  Patient reports he has started his clindamycin he has taken Tylenol No. 3 with some relief.         History today provided by: patient    Review of Systems   Constitutional:  Positive for activity change and chills. Negative for fatigue and fever.   Respiratory:  Negative for cough, shortness of breath and wheezing.    Cardiovascular:  Negative for chest pain, palpitations and leg swelling.   Gastrointestinal:  Negative for abdominal pain, constipation, diarrhea, nausea and vomiting.   Genitourinary:  Negative for difficulty urinating.   Musculoskeletal:  Positive for back pain. Negative for myalgias.   Skin:  Positive for color change and wound.   Neurological:  Negative for dizziness, weakness, light-headedness and numbness.   Hematological:  Does not bruise/bleed easily.   Psychiatric/Behavioral:  Negative for confusion and hallucinations. The patient is not nervous/anxious.        Vitals:

## 2025-05-16 NOTE — PATIENT INSTRUCTIONS
- Take all medications as prescribed.  -If you miss a dose do not take double dose, you can take as soon as you remember missing dose, if it is close to the next time a dose is due skip missed dose and continue regular dosing schedule adding missed dose to end of treatment.  -Ensure you drink plenty of fluids  -Recommend probiotic, start approximately senior living through antibiotic dosing and continue for 1 week following completion of antibiotics.    - Continue warm compresses, moist if available.  You should be completing this at minimum 3 times daily more often if you are able.  - Continue to keep area clean and dry use antibiotic soap scrub gently and let rinse off well pat dry.  - If wearing clothing may cover for comfort.  - Encouraged to call general surgery to see if can be seen sooner at this time.  - You should seek immediate emergency room attention if you develop fever, redness extends beyond half inch of abscess, mental status changes, shortness of breath, chest pain, nausea or vomiting, or lethargy.

## 2025-05-20 NOTE — PROGRESS NOTES
ATTENDING NOTE    I have reviewed akers elements of Darinel Lux history and exam. I have reviewed that chart. And I examined Darinel Lux   I have discussed the treatment plan with the resident and agree with the plan.  GC modifier.     Raised, erythematous tender area upper left back.    Reviewed plan for I and D with resident and physician.    Counseled patient on wound care - no manipulation of site, clean gently with soap and water, warm compresses. Careful review of urgent symptoms and need for immediate medical attention if condition worsens. Patient expressed understanding.  Patient was able to \"teach back\" to me the points of our discussion.    
medication adherence    Discussed use,benefit, and side effects of prescribed medications.  Barriers to medication compliance addressed.      All patient questions answered.  Pt voiced understanding.     Return in about 4 days (around 5/19/2025) for f/u abscess back.        Disclaimer: Some orall of this note was transcribed using voice-recognition software.This may cause typographical errors occasionally. Although all effort is made to fix these errors, please do not hesitate to contact our office if there isany concern with the understanding of this note.

## 2025-05-21 ENCOUNTER — OFFICE VISIT (OUTPATIENT)
Age: 40
End: 2025-05-21
Payer: COMMERCIAL

## 2025-05-21 VITALS
SYSTOLIC BLOOD PRESSURE: 118 MMHG | HEIGHT: 66 IN | WEIGHT: 147.8 LBS | DIASTOLIC BLOOD PRESSURE: 78 MMHG | BODY MASS INDEX: 23.75 KG/M2 | HEART RATE: 87 BPM

## 2025-05-21 DIAGNOSIS — Z76.89 ENCOUNTER FOR INCISION AND DRAINAGE PROCEDURE: ICD-10-CM

## 2025-05-21 DIAGNOSIS — L72.0 EPIDERMOID CYST OF SKIN OF BACK: Primary | ICD-10-CM

## 2025-05-21 PROCEDURE — 10061 I&D ABSCESS COMP/MULTIPLE: CPT | Performed by: FAMILY MEDICINE

## 2025-05-21 PROCEDURE — 99205 OFFICE O/P NEW HI 60 MIN: CPT | Performed by: STUDENT IN AN ORGANIZED HEALTH CARE EDUCATION/TRAINING PROGRAM

## 2025-05-21 RX ORDER — EPINEPHRINE 1 MG/ML
INJECTION, SOLUTION INTRAMUSCULAR; SUBCUTANEOUS ONCE
Status: CANCELLED | OUTPATIENT
Start: 2025-05-21

## 2025-05-21 RX ORDER — LIDOCAINE HYDROCHLORIDE AND EPINEPHRINE 10; 10 MG/ML; UG/ML
20 INJECTION, SOLUTION INFILTRATION; PERINEURAL ONCE
Status: COMPLETED | OUTPATIENT
Start: 2025-05-21 | End: 2025-05-21

## 2025-05-21 RX ADMIN — LIDOCAINE HYDROCHLORIDE AND EPINEPHRINE 10 ML: 10; 10 INJECTION, SOLUTION INFILTRATION; PERINEURAL at 11:24

## 2025-05-21 NOTE — PATIENT INSTRUCTIONS
Thank you letting us take care of you today. We hope that all your questions were addressed. If a question was overlooked or something else comes to mind after you return home, please call our office at [phone].    If you need to cancel or change an appointment, surgery or procedure, please contact the office at [phone].

## 2025-05-21 NOTE — PROGRESS NOTES
OPERATIVE NOTE    PATIENT: Darinel Lux    MRN: [id number]    DATE OF PROCEDURE: 5/21/2025     SURGEON: Dr. Hyman/ Adilene Boston, PGY5    PREOPERATIVE DIAGNOSIS: Abscess/epidermoid cyst    POSTOPERATIVE DIAGNOSIS: Same     PROCEDURE: Incision and Drainage    ANESTHESIA: Local    ESTIMATED BLOOD LOSS:  minimal    COMPLICATIONS: None     SPECIMENS:  Was Not Obtained     HISTORY: The patient is a 39 y.o. year old male with history of epidermoid cyst of his back status post incision and drainage 1 week ago who presents with infected epidermoid cyst.  Risks, benefits, expected outcome, and alternatives to the procedure were explained and all questions answered. Verbal/Signed  consent was obtained and patient prepped for procedure.    PROCEDURE: Time out was performed identifying patient and procedure. Patient was placed in prone position. The area of concern was identified measuring 3cm and palpated with evidence of fluctuance at the center.The area was prepped with betadine and draped in a sterile fashion. A total off 4 ml of 1% lidocaine with epinephrine was used to locally anesthetize the area. An 11 blade was used to make an incision measuring roughly 1.5 cm within the middle of the fluctuant area. The incision was carried down sharply and bloody/purulent fluid was expressed. Hemostats were used to completely open up the cavity ensuring all loculations were broken up. Saline was used to clean out cavity and iodoform packed was loosely packed into the cavity. A Band-Aid used as a dressing.    This concluded the procedure. The patient tolerated relatively well without immediate complications. All sponge and instrument counts were correct at the end of the procedure.      Adilene Boston MD  PGY-5 General Surgery  11:27 AM 05/21/25

## 2025-05-21 NOTE — PROGRESS NOTES
Visit Information    Have you changed or started any medications since your last visit including any over-the-counter medicines, vitamins, or herbal medicines? no   Are you having any side effects from any of your medications? -  no  Have you stopped taking any of your medications? Is so, why? -  no    Have you seen any other physician or provider since your last visit? Yes - Records Obtained  Have you had any other diagnostic tests since your last visit? No  Have you been seen in the emergency room and/or had an admission to a hospital since we last saw you? No  Have you had your routine dental cleaning in the past 6 months? no    Have you activated your [employer] account? If not, what are your barriers? Yes     Patient Care Team:  Alisha Hong MD as PCP - General (Family Medicine)  Kirk Hyman IV, DO as Consulting Physician (General Surgery)    Medical History Review  Past Medical, Family, and Social History reviewed and does not contribute to the patient presenting condition    Health Maintenance   Topic Date Due    Varicella vaccine (1 of 2 - 13+ 2-dose series) Never done    HIV screen  Never done    Hepatitis C screen  Never done    Hepatitis B vaccine (1 of 3 - 19+ 3-dose series) Never done    COVID-19 Vaccine (4 - 2024-25 season) 09/01/2024    Flu vaccine (Season Ended) 08/01/2025    Depression Monitoring  03/31/2026    DTaP/Tdap/Td vaccine (2 - Td or Tdap) 10/11/2033    Hepatitis A vaccine  Aged Out    Hib vaccine  Aged Out    HPV vaccine  Aged Out    Polio vaccine  Aged Out    Meningococcal (ACWY) vaccine  Aged Out    Meningococcal B vaccine  Aged Out    Pneumococcal 0-49 years Vaccine  Aged Out    Depression Screen  Discontinued

## 2025-05-21 NOTE — PROGRESS NOTES
History and Physical  [facility]    Patient's Name/Date of Birth: Darinel Lux / 1985 (39 y.o.)    Date: May 21, 2025     HPI: Pt is a 39 y.o. male who presents to [facility] for mid back cyst.  Patient underwent incision and drainage last week with family medicine.  He states the areas continues to be erythematous and draining purulent fluid.  He was sent home with a 10-day course of clindamycin.  Patient denies fevers, chills nausea or vomiting.  He has not had issues like this in the past.  Patient works at a steel mill.  He is a vape smoker.  Surgical history includes hand surgery and a cardiac cath when he was 10 years old.      Past Medical History:   Diagnosis Date    Atrial fibrillation (HCC)     heart cath       Past Surgical History:   Procedure Laterality Date    CARDIAC CATHETERIZATION  1995    and ablation     HAND SURGERY Right 2016       Current Outpatient Medications   Medication Sig Dispense Refill    clindamycin (CLEOCIN) 300 MG capsule Take 1 capsule by mouth 4 times daily for 10 days 40 capsule 0    lithium 300 MG tablet Take 50 mg by mouth every morning      lithium 300 MG tablet Take 25 mg by mouth at bedtime      amphetamine-dextroamphetamine (ADDERALL XR) 25 MG extended release capsule Take 30 mg by mouth 2 times daily.       No current facility-administered medications for this visit.       Allergies   Allergen Reactions    Pcn [Penicillins]        Family History   Problem Relation Age of Onset    No Known Problems Mother     No Known Problems Father     No Known Problems Sister     No Known Problems Brother     No Known Problems Maternal Grandmother     No Known Problems Maternal Grandfather     No Known Problems Paternal Grandmother     No Known Problems Paternal Grandfather     No Known Problems Other        Social History     Socioeconomic History    Marital status: Single     Spouse name: Caitlin    Number of children: 1    Years of education: 13    Highest

## 2025-05-22 ENCOUNTER — OFFICE VISIT (OUTPATIENT)
Age: 40
End: 2025-05-22

## 2025-05-22 DIAGNOSIS — L02.212 CUTANEOUS ABSCESS OF BACK EXCLUDING BUTTOCKS: Primary | ICD-10-CM

## 2025-05-22 NOTE — PROGRESS NOTES
Patient presents to clinic today after completing general surgery incident issues and drainage of abscess to back.  Patient reports there is packing in the area that needs to be removed today.  Packing is removed without incident patient tolerated well area has slight redness that is blanching no significant purulent drainage sanguinous drainage noted when packing removed area was cleansed with normal saline dry dressing reapplied.  Patient is to follow-up with general surgery next week for further assessment.

## 2025-05-23 ENCOUNTER — OFFICE VISIT (OUTPATIENT)
Age: 40
End: 2025-05-23
Payer: COMMERCIAL

## 2025-05-23 VITALS
HEIGHT: 66 IN | DIASTOLIC BLOOD PRESSURE: 70 MMHG | BODY MASS INDEX: 23.72 KG/M2 | WEIGHT: 147.6 LBS | SYSTOLIC BLOOD PRESSURE: 122 MMHG

## 2025-05-23 DIAGNOSIS — L02.212 CUTANEOUS ABSCESS OF BACK EXCLUDING BUTTOCKS: Primary | ICD-10-CM

## 2025-05-23 PROCEDURE — 99213 OFFICE O/P EST LOW 20 MIN: CPT

## 2025-05-23 NOTE — PATIENT INSTRUCTIONS
Thank you for letting us take care of you today. We hope all your questions were addressed. If a question was overlooked or something else comes to mind after you return home, please contact a member of your Care Team listed below.      Your Care Team at [facility] is Team #2  Phi Conner M.D. (Faculty)  Renee Alcaraz M.D. (Resident)  Alisha Hong M.D. (Resident)  Jay Denny M.D. (Resident)  Janel Christian M.D. (Resident)  Leyda Lopez M.D. (Resident)  Lang Acuna, [facility]  Ann Marie Ruiz, [facility]  Jessica Pool,  [facility]  Leatha Gutierrez, [facility]  Bhavna Messer, [facility]  Caroline Bojorquez, [facility]  Gulshan Yost (LJ) Nisha ANGELA (Clinical Practice Manager)  Karon Ramirez [facility] (Clinical Pharmacist)     Office phone number: [phone]    If you need to get in right away due to illness, please be advised we have \"Same Day\" appointments available Monday-Friday. Please call us at [phone] option #3 to schedule your \"Same Day\" appointment.

## 2025-05-23 NOTE — PROGRESS NOTES
[facility] - Outpatient Note      Subjective:    Darinel Lux is a 39 y.o. male with  has a past medical history of Atrial fibrillation (HCC).    Presented to the office today for:  Chief Complaint   Patient presents with    Abscess     Back abscess       HPI  Patient is a 39-year-old male seen today for follow-up of left upper back abscess.  Patient was seen a week ago and I&D was performed with no purulent material was expressed.  He was sent home with clindamycin 300 mg 4 times daily for 10 days and referred to general surgery.  Patient followed up with general surgery 2 days ago and another I&D was performed and purulent fluid was expressed.  Patient has a follow-up appointment in 1 week for wound evaluation.  States that he has not been taking his clindamycin 4 times daily due to work.  Today, abscess on left upper back appears much improved.        Review of Systems   Constitutional:  Negative for fatigue.   Eyes:  Negative for visual disturbance.   Respiratory:  Negative for shortness of breath.    Cardiovascular:  Negative for chest pain and palpitations.   Gastrointestinal:  Negative for abdominal pain, constipation, diarrhea, nausea and vomiting.   Endocrine: Negative.    Genitourinary:  Negative for dysuria.   Musculoskeletal: Negative.    Skin:  Positive for wound. Negative for rash.   Neurological:  Negative for headaches.   Psychiatric/Behavioral: Negative.                   The patient has a   Family History   Problem Relation Age of Onset    No Known Problems Mother     No Known Problems Father     No Known Problems Sister     No Known Problems Brother     No Known Problems Maternal Grandmother     No Known Problems Maternal Grandfather     No Known Problems Paternal Grandmother     No Known Problems Paternal Grandfather     No Known Problems Other        Objective:    /70 (BP Site: Right Upper Arm, Patient Position: Sitting, BP Cuff Size:

## 2025-05-23 NOTE — PROGRESS NOTES
Attending Physician Statement  I have discussed the care of BrianKingincluding pertinent history and exam findings,  with the resident. I have reviewed the key elements of all parts of the encounter with the resident.  I agree with the assessment, plan and orders as documented by the resident.  (GE Modifier)    S/P I&D - upper back- wound looks good. Sees Gen Surgery

## 2025-05-23 NOTE — PROGRESS NOTES
Visit Information    Have you changed or started any medications since your last visit including any over-the-counter medicines, vitamins, or herbal medicines? no   Have you stopped taking any of your medications? Is so, why? -  no  Are you having any side effects from any of your medications? - no    Have you seen any other physician or provider since your last visit?  yes - Urgent Care, General Surgery   Have you had any other diagnostic tests since your last visit?  no   Have you been seen in the emergency room and/or had an admission in a hospital since we last saw you?  no   Have you had your routine dental cleaning in the past 6 months?  no     Do you have an active MyChart account? If no, what is the barrier?  Yes    Patient Care Team:  Alisha Hong MD as PCP - General (Family Medicine)  Kirk Hyman IV, DO as Consulting Physician (General Surgery)    Medical History Review  Past Medical, Family, and Social History reviewed and does contribute to the patient presenting condition    Health Maintenance   Topic Date Due    Varicella vaccine (1 of 2 - 13+ 2-dose series) Never done    HIV screen  Never done    Hepatitis C screen  Never done    Hepatitis B vaccine (1 of 3 - 19+ 3-dose series) Never done    COVID-19 Vaccine (4 - 2024-25 season) 09/01/2024    Flu vaccine (Season Ended) 08/01/2025    Depression Monitoring  03/31/2026    DTaP/Tdap/Td vaccine (2 - Td or Tdap) 10/11/2033    Hepatitis A vaccine  Aged Out    Hib vaccine  Aged Out    HPV vaccine  Aged Out    Polio vaccine  Aged Out    Meningococcal (ACWY) vaccine  Aged Out    Meningococcal B vaccine  Aged Out    Pneumococcal 0-49 years Vaccine  Aged Out    Depression Screen  Discontinued

## 2025-05-28 ENCOUNTER — OFFICE VISIT (OUTPATIENT)
Age: 40
End: 2025-05-28
Payer: COMMERCIAL

## 2025-05-28 VITALS — HEART RATE: 76 BPM | SYSTOLIC BLOOD PRESSURE: 113 MMHG | DIASTOLIC BLOOD PRESSURE: 71 MMHG

## 2025-05-28 DIAGNOSIS — L72.0 EPIDERMOID CYST OF SKIN OF BACK: Primary | ICD-10-CM

## 2025-05-28 PROCEDURE — 99212 OFFICE O/P EST SF 10 MIN: CPT

## 2025-05-28 NOTE — PROGRESS NOTES
History and Physical  [facility]    Patient's Name/Date of Birth: Darinel Lux / 1985 (39 y.o.)    Date: May 28, 2025     HPI: Pt is a 39 y.o. male who presents to [facility] for follow up on back abscess. Patient was seen 1 week ago at [facility] for this back abscess. He underwent incision and drainage of the abscess, which he tolerated well.  Patient reports that he is only noticed some white blood fluid come out of the incision.  He has been keeping it clean.  He has not noticed any other purulent drainage.  He has been taking his clindamycin, but does miss a dose or 2 every day due to his work.  No fevers, chills at this time.  No nausea or vomiting.    Past Medical History:   Diagnosis Date    Atrial fibrillation (HCC)     heart cath       Past Surgical History:   Procedure Laterality Date    CARDIAC CATHETERIZATION  1995    and ablation     HAND SURGERY Right 2016       Current Outpatient Medications   Medication Sig Dispense Refill    lithium 300 MG tablet Take 50 mg by mouth every morning      lithium 300 MG tablet Take 25 mg by mouth at bedtime      amphetamine-dextroamphetamine (ADDERALL XR) 25 MG extended release capsule Take 30 mg by mouth 2 times daily.       No current facility-administered medications for this visit.       Allergies   Allergen Reactions    Pcn [Penicillins]        Family History   Problem Relation Age of Onset    No Known Problems Mother     No Known Problems Father     No Known Problems Sister     No Known Problems Brother     No Known Problems Maternal Grandmother     No Known Problems Maternal Grandfather     No Known Problems Paternal Grandmother     No Known Problems Paternal Grandfather     No Known Problems Other        Social History     Socioeconomic History    Marital status: Single     Spouse name: Caitlin    Number of children: 1    Years of education: 13    Highest education level: High school graduate   Occupational History    Not on file   Tobacco Use

## 2025-05-28 NOTE — PATIENT INSTRUCTIONS
Thank you letting us take care of you today. We hope that all your questions were addressed. If a question was overlooked or something else comes to mind after you return home, please call our office at [phone].    If you need to cancel or change an appointment, surgery or procedure, please contact the office at [phone].

## 2025-07-09 ENCOUNTER — OFFICE VISIT (OUTPATIENT)
Age: 40
End: 2025-07-09
Payer: COMMERCIAL

## 2025-07-09 VITALS
BODY MASS INDEX: 24.14 KG/M2 | HEART RATE: 76 BPM | WEIGHT: 150.2 LBS | HEIGHT: 66 IN | DIASTOLIC BLOOD PRESSURE: 89 MMHG | OXYGEN SATURATION: 98 % | SYSTOLIC BLOOD PRESSURE: 131 MMHG

## 2025-07-09 DIAGNOSIS — L72.3 SEBACEOUS CYST: Primary | ICD-10-CM

## 2025-07-09 PROCEDURE — 99213 OFFICE O/P EST LOW 20 MIN: CPT

## 2025-07-09 ASSESSMENT — PATIENT HEALTH QUESTIONNAIRE - PHQ9
6. FEELING BAD ABOUT YOURSELF - OR THAT YOU ARE A FAILURE OR HAVE LET YOURSELF OR YOUR FAMILY DOWN: NOT AT ALL
8. MOVING OR SPEAKING SO SLOWLY THAT OTHER PEOPLE COULD HAVE NOTICED. OR THE OPPOSITE, BEING SO FIGETY OR RESTLESS THAT YOU HAVE BEEN MOVING AROUND A LOT MORE THAN USUAL: NOT AT ALL
9. THOUGHTS THAT YOU WOULD BE BETTER OFF DEAD, OR OF HURTING YOURSELF: NOT AT ALL
SUM OF ALL RESPONSES TO PHQ QUESTIONS 1-9: 0
7. TROUBLE CONCENTRATING ON THINGS, SUCH AS READING THE NEWSPAPER OR WATCHING TELEVISION: NOT AT ALL
SUM OF ALL RESPONSES TO PHQ QUESTIONS 1-9: 0
1. LITTLE INTEREST OR PLEASURE IN DOING THINGS: NOT AT ALL
3. TROUBLE FALLING OR STAYING ASLEEP: NOT AT ALL
2. FEELING DOWN, DEPRESSED OR HOPELESS: NOT AT ALL
SUM OF ALL RESPONSES TO PHQ QUESTIONS 1-9: 0
5. POOR APPETITE OR OVEREATING: NOT AT ALL
SUM OF ALL RESPONSES TO PHQ QUESTIONS 1-9: 0
10. IF YOU CHECKED OFF ANY PROBLEMS, HOW DIFFICULT HAVE THESE PROBLEMS MADE IT FOR YOU TO DO YOUR WORK, TAKE CARE OF THINGS AT HOME, OR GET ALONG WITH OTHER PEOPLE: NOT DIFFICULT AT ALL
4. FEELING TIRED OR HAVING LITTLE ENERGY: NOT AT ALL

## 2025-07-09 NOTE — PROGRESS NOTES
Visit Information    Have you changed or started any medications since your last visit including any over-the-counter medicines, vitamins, or herbal medicines? no   Have you stopped taking any of your medications? Is so, why? -  no  Are you having any side effects from any of your medications? - no    Have you seen any other physician or provider since your last visit?  no   Have you had any other diagnostic tests since your last visit?  no   Have you been seen in the emergency room and/or had an admission in a hospital since we last saw you?  no   Have you had your routine dental cleaning in the past 6 months?  no     Do you have an active MyChart account? If no, what is the barrier?  Yes    Patient Care Team:  Alisha Hong MD as PCP - General (Family Medicine)  Kirk Hyman IV, DO as Consulting Physician (General Surgery)    Medical History Review  Past Medical, Family, and Social History reviewed and does not contribute to the patient presenting condition    Health Maintenance   Topic Date Due    Varicella vaccine (1 of 2 - 13+ 2-dose series) Never done    HIV screen  Never done    Hepatitis C screen  Never done    Hepatitis B vaccine (1 of 3 - 19+ 3-dose series) Never done    COVID-19 Vaccine (4 - 2024-25 season) 09/01/2024    Flu vaccine (1) 08/01/2025    Depression Monitoring  03/31/2026    DTaP/Tdap/Td vaccine (2 - Td or Tdap) 10/11/2033    Hepatitis A vaccine  Aged Out    Hib vaccine  Aged Out    HPV vaccine  Aged Out    Polio vaccine  Aged Out    Meningococcal (ACWY) vaccine  Aged Out    Meningococcal B vaccine  Aged Out    Pneumococcal 0-49 years Vaccine  Aged Out    Depression Screen  Discontinued

## 2025-07-09 NOTE — PROGRESS NOTES
[facility]   PROGRESS NOTE    DATE: July 9, 2025     SUBJECTIVE:  STACIA MASTERS is a 39 y.o. male who returns to the [facility] for evaluation of mid back cyst. Recall he underwent incision and drainage of cyst approx 1 month back and returns for formal cyst excision. He denies draining/tenderness/repeat infections. 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 He does smoke. He denies daily drinking. He has had ablation for a-fib. He can walk greater than 2 blocks/and climb greater than 2 flight stairs without chest pain or shortness of breathe.     Past Medical History:   Diagnosis Date    Atrial fibrillation (HCC)     heart cath       Past Surgical History:   Procedure Laterality Date    CARDIAC CATHETERIZATION  1995    and ablation     HAND SURGERY Right 2016       Current Outpatient Medications   Medication Sig Dispense Refill    lithium 300 MG tablet Take 50 mg by mouth every morning      lithium 300 MG tablet Take 25 mg by mouth at bedtime      amphetamine-dextroamphetamine (ADDERALL XR) 25 MG extended release capsule Take 30 mg by mouth 2 times daily.       No current facility-administered medications for this visit.       Allergies   Allergen Reactions    Pcn [Penicillins]        Family History   Problem Relation Age of Onset    No Known Problems Mother     No Known Problems Father     No Known Problems Sister     No Known Problems Brother     No Known Problems Maternal Grandmother     No Known Problems Maternal Grandfather     No Known Problems Paternal Grandmother     No Known Problems Paternal Grandfather     No Known Problems Other        Social History     Socioeconomic History    Marital status: Single     Spouse name: Caitlin    Number of children: 1    Years of education: 13    Highest education level: High school graduate   Occupational History    Not on file   Tobacco Use    Smoking status: Former     Current packs/day: 0.00     Average packs/day: 1 pack/day for 18.0

## 2025-07-09 NOTE — PATIENT INSTRUCTIONS
Thank you letting us take care of you today. We hope that all your questions were addressed. If a question was overlooked or something else comes to mind after you return home, please call our office at [phone].    If you need to cancel or change an appointment, surgery or procedure, please contact the office at [phone].

## 2025-07-18 ENCOUNTER — HOSPITAL ENCOUNTER (OUTPATIENT)
Dept: PREADMISSION TESTING | Age: 40
Discharge: HOME | End: 2025-07-22

## 2025-07-18 ENCOUNTER — ANESTHESIA EVENT (OUTPATIENT)
Dept: OPERATING ROOM | Age: 40
End: 2025-07-18
Payer: COMMERCIAL

## 2025-07-18 RX ORDER — DEXTROAMPHETAMINE SACCHARATE, AMPHETAMINE ASPARTATE MONOHYDRATE, DEXTROAMPHETAMINE SULFATE AND AMPHETAMINE SULFATE 7.5; 7.5; 7.5; 7.5 MG/1; MG/1; MG/1; MG/1
30 CAPSULE, EXTENDED RELEASE ORAL EVERY MORNING
COMMUNITY

## 2025-07-22 ENCOUNTER — ANESTHESIA (OUTPATIENT)
Dept: OPERATING ROOM | Age: 40
End: 2025-07-22
Payer: COMMERCIAL

## 2025-07-22 ENCOUNTER — HOSPITAL ENCOUNTER (OUTPATIENT)
Age: 40
Setting detail: OUTPATIENT SURGERY
Discharge: HOME | End: 2025-07-22
Attending: SURGERY | Admitting: SURGERY
Payer: COMMERCIAL

## 2025-07-22 VITALS
BODY MASS INDEX: 23.86 KG/M2 | SYSTOLIC BLOOD PRESSURE: 116 MMHG | HEART RATE: 67 BPM | TEMPERATURE: 98.1 F | RESPIRATION RATE: 17 BRPM | OXYGEN SATURATION: 100 % | WEIGHT: 152 LBS | HEIGHT: 67 IN | DIASTOLIC BLOOD PRESSURE: 68 MMHG

## 2025-07-22 DIAGNOSIS — L72.0 EPIDERMOID CYST OF SKIN OF BACK: ICD-10-CM

## 2025-07-22 LAB
EKG ATRIAL RATE: 69 BPM
EKG P AXIS: 49 DEGREES
EKG P-R INTERVAL: 138 MS
EKG Q-T INTERVAL: 386 MS
EKG QRS DURATION: 98 MS
EKG QTC CALCULATION (BAZETT): 413 MS
EKG R AXIS: 88 DEGREES
EKG T AXIS: 44 DEGREES
EKG VENTRICULAR RATE: 69 BPM

## 2025-07-22 PROCEDURE — 2500000003 HC RX 250 WO HCPCS: Performed by: NURSE ANESTHETIST, CERTIFIED REGISTERED

## 2025-07-22 PROCEDURE — 7100000011 HC PHASE II RECOVERY - ADDTL 15 MIN: Performed by: SURGERY

## 2025-07-22 PROCEDURE — 3600000002 HC SURGERY LEVEL 2 BASE: Performed by: SURGERY

## 2025-07-22 PROCEDURE — 88304 TISSUE EXAM BY PATHOLOGIST: CPT

## 2025-07-22 PROCEDURE — 6360000002 HC RX W HCPCS: Performed by: SURGERY

## 2025-07-22 PROCEDURE — 93005 ELECTROCARDIOGRAM TRACING: CPT

## 2025-07-22 PROCEDURE — 7100000010 HC PHASE II RECOVERY - FIRST 15 MIN: Performed by: SURGERY

## 2025-07-22 PROCEDURE — 3700000001 HC ADD 15 MINUTES (ANESTHESIA): Performed by: SURGERY

## 2025-07-22 PROCEDURE — 3700000000 HC ANESTHESIA ATTENDED CARE: Performed by: SURGERY

## 2025-07-22 PROCEDURE — 3600000012 HC SURGERY LEVEL 2 ADDTL 15MIN: Performed by: SURGERY

## 2025-07-22 PROCEDURE — 2500000003 HC RX 250 WO HCPCS: Performed by: SURGERY

## 2025-07-22 PROCEDURE — 2580000003 HC RX 258: Performed by: ANESTHESIOLOGY

## 2025-07-22 PROCEDURE — 6360000002 HC RX W HCPCS: Performed by: NURSE ANESTHETIST, CERTIFIED REGISTERED

## 2025-07-22 PROCEDURE — 2709999900 HC NON-CHARGEABLE SUPPLY: Performed by: SURGERY

## 2025-07-22 PROCEDURE — 6370000000 HC RX 637 (ALT 250 FOR IP): Performed by: SURGERY

## 2025-07-22 RX ORDER — PROCHLORPERAZINE EDISYLATE 5 MG/ML
10 INJECTION INTRAMUSCULAR; INTRAVENOUS
Status: DISCONTINUED | OUTPATIENT
Start: 2025-07-22 | End: 2025-07-22 | Stop reason: HOSPADM

## 2025-07-22 RX ORDER — PROPOFOL 10 MG/ML
INJECTION, EMULSION INTRAVENOUS
Status: DISCONTINUED | OUTPATIENT
Start: 2025-07-22 | End: 2025-07-22 | Stop reason: SDUPTHER

## 2025-07-22 RX ORDER — METOCLOPRAMIDE HYDROCHLORIDE 5 MG/ML
10 INJECTION INTRAMUSCULAR; INTRAVENOUS
Status: DISCONTINUED | OUTPATIENT
Start: 2025-07-22 | End: 2025-07-22 | Stop reason: HOSPADM

## 2025-07-22 RX ORDER — SODIUM CHLORIDE 0.9 % (FLUSH) 0.9 %
5-40 SYRINGE (ML) INJECTION EVERY 12 HOURS SCHEDULED
Status: DISCONTINUED | OUTPATIENT
Start: 2025-07-22 | End: 2025-07-22 | Stop reason: HOSPADM

## 2025-07-22 RX ORDER — LIDOCAINE HYDROCHLORIDE 10 MG/ML
1 INJECTION, SOLUTION EPIDURAL; INFILTRATION; INTRACAUDAL; PERINEURAL
Status: DISCONTINUED | OUTPATIENT
Start: 2025-07-23 | End: 2025-07-22 | Stop reason: HOSPADM

## 2025-07-22 RX ORDER — SODIUM CHLORIDE, SODIUM LACTATE, POTASSIUM CHLORIDE, CALCIUM CHLORIDE 600; 310; 30; 20 MG/100ML; MG/100ML; MG/100ML; MG/100ML
INJECTION, SOLUTION INTRAVENOUS CONTINUOUS
Status: DISCONTINUED | OUTPATIENT
Start: 2025-07-22 | End: 2025-07-22 | Stop reason: HOSPADM

## 2025-07-22 RX ORDER — LIDOCAINE HYDROCHLORIDE 20 MG/ML
INJECTION, SOLUTION EPIDURAL; INFILTRATION; INTRACAUDAL; PERINEURAL
Status: DISCONTINUED | OUTPATIENT
Start: 2025-07-22 | End: 2025-07-22 | Stop reason: SDUPTHER

## 2025-07-22 RX ORDER — FENTANYL CITRATE 50 UG/ML
25 INJECTION, SOLUTION INTRAMUSCULAR; INTRAVENOUS EVERY 5 MIN PRN
Status: DISCONTINUED | OUTPATIENT
Start: 2025-07-22 | End: 2025-07-22 | Stop reason: HOSPADM

## 2025-07-22 RX ORDER — SODIUM CHLORIDE 9 MG/ML
INJECTION, SOLUTION INTRAVENOUS PRN
Status: DISCONTINUED | OUTPATIENT
Start: 2025-07-22 | End: 2025-07-22 | Stop reason: HOSPADM

## 2025-07-22 RX ORDER — OXYCODONE HYDROCHLORIDE 5 MG/1
5 TABLET ORAL
Status: DISCONTINUED | OUTPATIENT
Start: 2025-07-22 | End: 2025-07-22 | Stop reason: HOSPADM

## 2025-07-22 RX ORDER — FENTANYL CITRATE 50 UG/ML
INJECTION, SOLUTION INTRAMUSCULAR; INTRAVENOUS
Status: DISCONTINUED | OUTPATIENT
Start: 2025-07-22 | End: 2025-07-22 | Stop reason: SDUPTHER

## 2025-07-22 RX ORDER — SODIUM CHLORIDE 9 MG/ML
INJECTION, SOLUTION INTRAVENOUS CONTINUOUS
Status: DISCONTINUED | OUTPATIENT
Start: 2025-07-22 | End: 2025-07-22 | Stop reason: HOSPADM

## 2025-07-22 RX ORDER — DIPHENHYDRAMINE HYDROCHLORIDE 50 MG/ML
12.5 INJECTION, SOLUTION INTRAMUSCULAR; INTRAVENOUS
Status: DISCONTINUED | OUTPATIENT
Start: 2025-07-22 | End: 2025-07-22 | Stop reason: HOSPADM

## 2025-07-22 RX ORDER — KETAMINE HCL IN 0.9 % NACL 50 MG/5 ML
SYRINGE (ML) INTRAVENOUS
Status: DISCONTINUED | OUTPATIENT
Start: 2025-07-22 | End: 2025-07-22 | Stop reason: SDUPTHER

## 2025-07-22 RX ORDER — SODIUM CHLORIDE 0.9 % (FLUSH) 0.9 %
5-40 SYRINGE (ML) INJECTION PRN
Status: DISCONTINUED | OUTPATIENT
Start: 2025-07-22 | End: 2025-07-22 | Stop reason: HOSPADM

## 2025-07-22 RX ORDER — GINSENG 100 MG
CAPSULE ORAL PRN
Status: DISCONTINUED | OUTPATIENT
Start: 2025-07-22 | End: 2025-07-22 | Stop reason: ALTCHOICE

## 2025-07-22 RX ORDER — MEPERIDINE HYDROCHLORIDE 25 MG/ML
12.5 INJECTION INTRAMUSCULAR; INTRAVENOUS; SUBCUTANEOUS EVERY 5 MIN PRN
Status: DISCONTINUED | OUTPATIENT
Start: 2025-07-22 | End: 2025-07-22 | Stop reason: HOSPADM

## 2025-07-22 RX ORDER — MIDAZOLAM HYDROCHLORIDE 1 MG/ML
INJECTION, SOLUTION INTRAMUSCULAR; INTRAVENOUS
Status: DISCONTINUED | OUTPATIENT
Start: 2025-07-22 | End: 2025-07-22 | Stop reason: SDUPTHER

## 2025-07-22 RX ORDER — HYDROMORPHONE HYDROCHLORIDE 1 MG/ML
0.5 INJECTION, SOLUTION INTRAMUSCULAR; INTRAVENOUS; SUBCUTANEOUS EVERY 5 MIN PRN
Status: DISCONTINUED | OUTPATIENT
Start: 2025-07-22 | End: 2025-07-22 | Stop reason: HOSPADM

## 2025-07-22 RX ADMIN — Medication 10 MG: at 09:39

## 2025-07-22 RX ADMIN — SODIUM CHLORIDE, SODIUM LACTATE, POTASSIUM CHLORIDE, AND CALCIUM CHLORIDE: .6; .31; .03; .02 INJECTION, SOLUTION INTRAVENOUS at 07:56

## 2025-07-22 RX ADMIN — FENTANYL CITRATE 50 MCG: 50 INJECTION INTRAMUSCULAR; INTRAVENOUS at 09:43

## 2025-07-22 RX ADMIN — LIDOCAINE HYDROCHLORIDE 60 MG: 20 INJECTION, SOLUTION EPIDURAL; INFILTRATION; INTRACAUDAL; PERINEURAL at 09:34

## 2025-07-22 RX ADMIN — PROPOFOL 100 MCG/KG/MIN: 10 INJECTION, EMULSION INTRAVENOUS at 09:34

## 2025-07-22 RX ADMIN — FENTANYL CITRATE 50 MCG: 50 INJECTION INTRAMUSCULAR; INTRAVENOUS at 09:34

## 2025-07-22 RX ADMIN — MIDAZOLAM 2 MG: 1 INJECTION INTRAMUSCULAR; INTRAVENOUS at 09:31

## 2025-07-22 RX ADMIN — Medication 2000 MG: at 09:36

## 2025-07-22 RX ADMIN — Medication 10 MG: at 09:37

## 2025-07-22 ASSESSMENT — PAIN - FUNCTIONAL ASSESSMENT: PAIN_FUNCTIONAL_ASSESSMENT: 0-10

## 2025-07-22 NOTE — DISCHARGE INSTRUCTIONS
Surgery Patient Discharge Instructions    Discharge Date:  7/22/2025    Discharged To:  Home    RESUME ACTIVITY:     WOUND CARE:   Do not remove top dressing for 48 hrs.    Leave sutures in place until office visit.     BATHING:  Ok to shower in 48 hrs.     DRIVING: No driving for while on pain medication    RETURN TO WORK:   Desk job only    WALKING:    Yes    LIFTING: Avoid lifting objects heavier than 10 lbs for 2 weeks.    DIET:   Ok to resume regular diet.     SPECIAL INSTRUCTIONS:  After you leave the hospital, call your doctor if any of the following occurs:   Pain or symptoms that worsen   Other new symptoms   Signs of infection, including fever and chills   Nausea and/or vomiting that you can't control with the medications you were given   Pain that you can't control with the medications you've been given   Excessive tenderness or swelling   Changes in bowel or sexual function   Dizziness or lightheadedness   Rash or hives       Watch for signs of infection:    Excessive warmth or bright redness around your incisions    Leakage of bloody or cloudy fluid from you incisions    Fever over 100.5

## 2025-07-22 NOTE — ANESTHESIA PRE PROCEDURE
Department of Anesthesiology  Preprocedure Note       Name:  Darinel Lux   Age:  39 y.o.  :  1985                                          MRN:  [id number]         Date:  2025      Surgeon: Surgeon(s):  Kirk Hyman IV, DO    Procedure: Procedure(s):  EXCISION  CYST ON BACK    Medications prior to admission:   Prior to Admission medications    Medication Sig Start Date End Date Taking? Authorizing Provider   amphetamine-dextroamphetamine (ADDERALL XR) 30 MG extended release capsule Take 1 capsule by mouth every morning.   Yes ProviderGerry MD   lithium 300 MG tablet Take 1 tablet by mouth 3 times daily 24  Yes ProviderGerry MD       Current medications:    Current Facility-Administered Medications   Medication Dose Route Frequency Provider Last Rate Last Admin    [START ON 2025] lidocaine PF 1 % injection 1 mL  1 mL IntraDERmal Once PRN Heath Gómez MD        0.9 % sodium chloride infusion   IntraVENous Continuous Heath Gómez MD        lactated ringers infusion   IntraVENous Continuous Heath Gómez  mL/hr at 25 0756 New Bag at 25 0756    ceFAZolin (ANCEF) 2000 mg in sterile water 20 mL IV syringe  2,000 mg IntraVENous Once Kirk Hyman IV, DO           Allergies:    Allergies   Allergen Reactions    Pcn [Penicillins] Hives       Problem List:    Patient Active Problem List   Diagnosis Code    Atrial fibrillation (HCC) I48.91    Recurrent major depressive disorder, in full remission F33.42    Attention deficit hyperactivity disorder (ADHD), combined type F90.2    Elevated blood pressure reading R03.0    History of substance abuse ([facility]) F19.11    Lumbar back sprain S33.5XXA       Past Medical History:        Diagnosis Date    ADHD     on medication    Atrial fibrillation (HCC)     heart cath age 10 no problems since and no cardiologist    Bipolar 1 disorder (HCC)     on lithium       Past Surgical History:        Procedure Laterality Date    CARDIAC

## 2025-07-22 NOTE — H&P
Interval H&P Note    Pt Name: Darinel Lux  MRN: [id number]  YOB: 1985  Date of evaluation: 7/22/2025      [x] I have reviewed in EPIC the progress note by Dr. Hyman dated 07/09/2025 for an Interval History and Physical note.     [x] I have examined  Darinel Lux, a 39 y.o. male.There are no changes to the patient who is scheduled for EXCISION  CYST ON BACK by Kirk Hyman IV, DO for Epidermoid cyst of skin of back. The patient denies new health changes, fever, chills, wheezing, cough, increased SOB, chest pain, open sores or wounds. Denies hx of diabetes. Denies any current blood thinning medications.     Vital signs: /87   Pulse 73   Temp 97.5 °F (36.4 °C)   Resp 14   Ht 1.702 m (5' 7\")   Wt 68.9 kg (152 lb)   SpO2 100%   BMI 23.81 kg/m²     Allergies:  Pcn [penicillins]    Medications:    Prior to Admission medications    Medication Sig Start Date End Date Taking? Authorizing Provider   amphetamine-dextroamphetamine (ADDERALL XR) 30 MG extended release capsule Take 1 capsule by mouth every morning.   Yes ProviderGerry MD   lithium 300 MG tablet Take 1 tablet by mouth 3 times daily 5/8/24  Yes Provider, MD Gerry       This is a 39 y.o. male who is pleasant, cooperative, alert and oriented x3, in no acute distress.    Heart: Heart sounds are normal. HR 73 regular rate and rhythm without murmur, gallop or rub.   Lungs: Normal respiratory effort with equal expansion, good air exchange, unlabored and clear to auscultation without wheezes or rales bilaterally   Abdomen: soft, nontender, nondistended with bowel sounds active.   Skin: Lesion to left mid back with central punctum - no drainage present.    Labs:  No results for input(s): \"HGB\", \"HCT\", \"WBC\", \"MCV\", \"PLT\", \"NA\", \"K\", \"CL\", \"CO2\", \"BUN\", \"CREATININE\", \"GLUCOSE\", \"INR\", \"PROTIME\", \"APTT\", \"AST\", \"ALT\", \"LABALBU\", \"HCG\" in the last 720 hours.    No results for input(s): \"COVID19\" in the last 720 hours.    Ivis

## 2025-07-22 NOTE — ANESTHESIA POSTPROCEDURE EVALUATION
Department of Anesthesiology  Postprocedure Note    Patient: Darinel Lux  MRN: [id number]  YOB: 1985  Date of evaluation: 7/22/2025    Procedure Summary       Date: 07/22/25 Room / Location: [address]    Anesthesia Start: 0931 Anesthesia Stop: 1009    Procedure: EXCISION  CYST ON BACK Diagnosis:       Epidermoid cyst of skin of back      (Epidermoid cyst of skin of back [L72.0])    Surgeons: Kirk Hyman IV, DO Responsible Provider: Kimberly Blank MD    Anesthesia Type: MAC ASA Status: 2            Anesthesia Type: No value filed.    Willy Phase I: Willy Score: 10    Willy Phase II: Willy Score: 10    Anesthesia Post Evaluation    Patient location during evaluation: PACU  Patient participation: complete - patient participated  Level of consciousness: awake and alert  Airway patency: patent  Nausea & Vomiting: no nausea and no vomiting  Cardiovascular status: hemodynamically stable  Respiratory status: acceptable  Hydration status: euvolemic  Pain management: adequate    No notable events documented.

## 2025-07-22 NOTE — OP NOTE
Operative Note      Patient: Darinel Lux  YOB: 1985  MRN: [id number]    Date of Procedure: 7/22/2025    Pre-Op Diagnosis Codes:      * Epidermoid cyst of skin of back [L72.0]    Post-Op Diagnosis: Same       Procedure(s):  EXCISION  CYST ON BACK    Surgeon(s):  Kirk Hyman IV, DO    Assistant:   Resident: Jillian Shell DO    Anesthesia: General    Estimated Blood Loss (mL): Minimal    Complications: None    Specimens:   ID Type Source Tests Collected by Time Destination   A : MID BACK CYST Tissue Back SURGICAL PATHOLOGY Kirk Hyman IV,  7/22/2025 0952        Implants:  * No implants in log *      Drains: * No LDAs found *    Findings:  Present At Time Of Surgery (PATOS) (choose all levels that have infection present):  No infection present  Other Findings: wound class II, epidermoid cyst with open punctum, no drainage, cyst removed entirely 4 cm x 2cm wound    This procedure was not performed to treat primary cutaneous melanoma through wide local excision    Detailed Description of Procedure:   Patient was brought to the operating room and assisted in transfer the operating room table in the prone position.  He underwent monitored anesthesia care.  The mid back was prepped and draped in the usual sterile fashion.  Timeout was performed identifying correct patient procedure allergies indications to virus.  All in agreement.  The area was infiltrated with half percent Marcaine plain.  Elliptical excision was made with 15 blade through the epidermis dermis and subcu fat to excise the cyst as well as overlying scarred skin and punctum.  Bovie electrocautery was used to complete resection.  Hemostasis was achieved with Bovie cautery.  The wound was irrigated with normal saline.  Wound measured 4 cm x 2 cm.  The skin was closed with 3-0 nylon in a vertical mattress fashion.  Bacitracin and island dressing applied.  Patient tolerated procedure and was awakened from anesthesia and

## 2025-07-24 LAB — SURGICAL PATHOLOGY REPORT: NORMAL

## 2025-07-30 ENCOUNTER — TELEPHONE (OUTPATIENT)
Age: 40
End: 2025-07-30

## 2025-07-30 NOTE — TELEPHONE ENCOUNTER
Patient spouse Caitlin called in regards to scheduling patient a follow up with sergo. Patient peg on 8-6-2025.

## 2025-08-06 ENCOUNTER — OFFICE VISIT (OUTPATIENT)
Age: 40
End: 2025-08-06
Payer: COMMERCIAL

## 2025-08-06 VITALS — TEMPERATURE: 98.2 F | HEART RATE: 75 BPM | SYSTOLIC BLOOD PRESSURE: 130 MMHG | DIASTOLIC BLOOD PRESSURE: 90 MMHG

## 2025-08-06 DIAGNOSIS — Z48.02 VISIT FOR SUTURE REMOVAL: Primary | ICD-10-CM

## 2025-08-06 PROCEDURE — 99213 OFFICE O/P EST LOW 20 MIN: CPT

## (undated) DEVICE — GLOVE SURG SZ 8 CRM LTX FREE POLYISOPRENE POLYMER BEAD ANTI

## (undated) DEVICE — SHEET, T, LAPAROTOMY, STERILE: Brand: MEDLINE

## (undated) DEVICE — SYRINGE IRRIG 60ML SFT PLIABLE BLB EZ TO GRP 1 HND USE W/

## (undated) DEVICE — 4-PORT MANIFOLD: Brand: NEPTUNE 2

## (undated) DEVICE — ELECTRODE PT RET AD L9FT HI MOIST COND ADH HYDRGEL CORDED

## (undated) DEVICE — STAZ MINOR BASIN: Brand: MEDLINE INDUSTRIES, INC.

## (undated) DEVICE — GAUZE, BORDER, 3"X6", 1.5"X4"PAD, STERIL: Brand: MEDLINE INDUSTRIES, INC.

## (undated) DEVICE — STRIP,CLOSURE,WOUND,MEDI-STRIP,1/2X4: Brand: MEDLINE

## (undated) DEVICE — SUTURE MONOCRYL SZ 4-0 L27IN ABSRB UD L19MM PS-2 1/2 CIR PRIM Y426H

## (undated) DEVICE — SUTURE MONOCRYL SZ 3-0 L27IN ABSRB UD PS-2 3/8 CIR REV CUT NDL MCP427H

## (undated) DEVICE — GLOVE SURG SZ 75 CRM LTX FREE POLYISOPRENE POLYMER BEAD ANTI